# Patient Record
Sex: MALE | Race: OTHER | Employment: UNEMPLOYED | URBAN - METROPOLITAN AREA
[De-identification: names, ages, dates, MRNs, and addresses within clinical notes are randomized per-mention and may not be internally consistent; named-entity substitution may affect disease eponyms.]

---

## 2017-03-23 ENCOUNTER — HOSPITAL ENCOUNTER (OUTPATIENT)
Dept: LAB | Age: 32
Discharge: HOME | End: 2017-03-23

## 2017-03-23 ENCOUNTER — OFFICE VISIT (OUTPATIENT)
Dept: FAMILY MEDICINE CLINIC | Age: 32
End: 2017-03-23

## 2017-03-23 VITALS
TEMPERATURE: 97.7 F | HEART RATE: 64 BPM | SYSTOLIC BLOOD PRESSURE: 103 MMHG | BODY MASS INDEX: 33.46 KG/M2 | WEIGHT: 196 LBS | HEIGHT: 64 IN | DIASTOLIC BLOOD PRESSURE: 75 MMHG

## 2017-03-23 DIAGNOSIS — M89.9 LYTIC BONE LESION OF RIGHT FEMUR: Primary | ICD-10-CM

## 2017-03-23 DIAGNOSIS — M89.9 LYTIC BONE LESION OF RIGHT FEMUR: ICD-10-CM

## 2017-03-23 DIAGNOSIS — M79.604 PAIN OF RIGHT LOWER EXTREMITY: ICD-10-CM

## 2017-03-23 LAB
ALBUMIN SERPL BCP-MCNC: 4.7 G/DL (ref 3.5–5)
ALBUMIN/GLOB SERPL: 1.6 {RATIO} (ref 1.1–2.2)
ALP SERPL-CCNC: 69 U/L (ref 45–117)
ALT SERPL-CCNC: 33 U/L (ref 12–78)
ANION GAP BLD CALC-SCNC: 6 MMOL/L (ref 5–15)
AST SERPL W P-5'-P-CCNC: 12 U/L (ref 15–37)
BASOPHILS # BLD AUTO: 0 K/UL (ref 0–0.1)
BASOPHILS # BLD: 1 % (ref 0–1)
BILIRUB SERPL-MCNC: 0.7 MG/DL (ref 0.2–1)
BUN SERPL-MCNC: 11 MG/DL (ref 6–20)
BUN/CREAT SERPL: 14 (ref 12–20)
CALCIUM SERPL-MCNC: 9.6 MG/DL (ref 8.5–10.1)
CHLORIDE SERPL-SCNC: 104 MMOL/L (ref 97–108)
CO2 SERPL-SCNC: 28 MMOL/L (ref 21–32)
CREAT SERPL-MCNC: 0.78 MG/DL (ref 0.7–1.3)
EOSINOPHIL # BLD: 0.3 K/UL (ref 0–0.4)
EOSINOPHIL NFR BLD: 4 % (ref 0–7)
ERYTHROCYTE [DISTWIDTH] IN BLOOD BY AUTOMATED COUNT: 13.4 % (ref 11.5–14.5)
GLOBULIN SER CALC-MCNC: 3 G/DL (ref 2–4)
GLUCOSE SERPL-MCNC: 80 MG/DL (ref 65–100)
HCT VFR BLD AUTO: 47.5 % (ref 36.6–50.3)
HGB BLD-MCNC: 15.5 G/DL (ref 12.1–17)
LYMPHOCYTES # BLD AUTO: 27 % (ref 12–49)
LYMPHOCYTES # BLD: 1.7 K/UL (ref 0.8–3.5)
MCH RBC QN AUTO: 29.7 PG (ref 26–34)
MCHC RBC AUTO-ENTMCNC: 32.6 G/DL (ref 30–36.5)
MCV RBC AUTO: 91 FL (ref 80–99)
MONOCYTES # BLD: 0.5 K/UL (ref 0–1)
MONOCYTES NFR BLD AUTO: 8 % (ref 5–13)
NEUTS SEG # BLD: 3.9 K/UL (ref 1.8–8)
NEUTS SEG NFR BLD AUTO: 60 % (ref 32–75)
PLATELET # BLD AUTO: 318 K/UL (ref 150–400)
POTASSIUM SERPL-SCNC: 4.4 MMOL/L (ref 3.5–5.1)
PROT SERPL-MCNC: 7.7 G/DL (ref 6.4–8.2)
RBC # BLD AUTO: 5.22 M/UL (ref 4.1–5.7)
SODIUM SERPL-SCNC: 138 MMOL/L (ref 136–145)
WBC # BLD AUTO: 6.4 K/UL (ref 4.1–11.1)

## 2017-03-23 PROCEDURE — 85025 COMPLETE CBC W/AUTO DIFF WBC: CPT | Performed by: NURSE PRACTITIONER

## 2017-03-23 PROCEDURE — 80053 COMPREHEN METABOLIC PANEL: CPT | Performed by: NURSE PRACTITIONER

## 2017-03-23 RX ORDER — NAPROXEN 500 MG/1
500 TABLET ORAL 2 TIMES DAILY WITH MEALS
Qty: 60 TAB | Refills: 2 | Status: SHIPPED | OUTPATIENT
Start: 2017-03-23

## 2017-03-23 NOTE — PATIENT INSTRUCTIONS
Leg Pain: Care Instructions  Your Care Instructions  Many things can cause leg pain. Too much exercise or overuse can cause a muscle cramp (or charley horse). You can get leg cramps from not eating a balanced diet that has enough potassium, calcium, and other minerals. If you do not drink enough fluids or are taking certain medicines, you may develop leg cramps. Other causes of leg pain include injuries, blood flow problems, nerve damage, and twisted and enlarged veins (varicose veins). You can usually ease pain with self-care. Your doctor may recommend that you rest your leg and keep it elevated. Follow-up care is a key part of your treatment and safety. Be sure to make and go to all appointments, and call your doctor if you are having problems. Its also a good idea to know your test results and keep a list of the medicines you take. How can you care for yourself at home? · Take pain medicines exactly as directed. ¨ If the doctor gave you a prescription medicine for pain, take it as prescribed. ¨ If you are not taking a prescription pain medicine, ask your doctor if you can take an over-the-counter medicine. · Take any other medicines exactly as prescribed. Call your doctor if you think you are having a problem with your medicine. · Rest your leg while you have pain, and avoid standing for long periods of time. · Prop up your leg at or above the level of your heart when possible. · Make sure you are eating a balanced diet that is rich in calcium, potassium, and magnesium, especially if you are pregnant. · If directed by your doctor, put ice or a cold pack on the area for 10 to 20 minutes at a time. Put a thin cloth between the ice and your skin. · Your leg may be in a splint, a brace, or an elastic bandage, and you may have crutches to help you walk. Follow your doctor's directions about how long to wear supports and how to use the crutches. When should you call for help?   Call 911 anytime you think you may need emergency care. For example, call if:  · You have sudden chest pain and shortness of breath, or you cough up blood. · Your leg is cool or pale or changes color. Call your doctor now or seek immediate medical care if:  · You have increasing or severe pain. · Your leg suddenly feels weak and you cannot move it. · You have signs of a blood clot, such as:  ¨ Pain in your calf, back of the knee, thigh, or groin. ¨ Redness and swelling in your leg or groin. · You have signs of infection, such as:  ¨ Increased pain, swelling, warmth, or redness. ¨ Red streaks leading from the sore area. ¨ Pus draining from a place on your leg. ¨ A fever. · You cannot bear weight on your leg. Watch closely for changes in your health, and be sure to contact your doctor if:  · You do not get better as expected. Where can you learn more? Go to http://chema-maximiliano.info/. Enter F456 in the search box to learn more about \"Leg Pain: Care Instructions. \"  Current as of: May 27, 2016  Content Version: 11.1  © 6936-7272 [employer]. Care instructions adapted under license by [employer] (which disclaims liability or warranty for this information). If you have questions about a medical condition or this instruction, always ask your healthcare professional. [address] any warranty or liability for your use of this information.

## 2017-03-23 NOTE — PROGRESS NOTES
AVS printed and reviewed. MRI scheduled at [facility] for Curtis 3/26/17 w/ arrival at 7:45a for 8a test. Instructed No Metal on body. Billing issues w/hospital test discussed and encouraged to apply for financial assistance. Sent to meet w/ , Farshad Xavier to start [facility] intake process.  Discussed Access Now program.
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
## 2017-03-23 NOTE — MR AVS SNAPSHOT
Visit Information [facility] [place] Personal Médico Departamento Teléfono del Dep. Número de visita 3/23/2017  9:30 AM Shana No NP [place] [phone] Follow-up Instructions Return if symptoms worsen or fail to improve. Upcoming Health Maintenance Date Due DTaP/Tdap/Td series (1 - Tdap) 6/2/2008 INFLUENZA AGE 9 TO ADULT 8/1/2016 Alergias  Review Complete El: 3/23/2017 Por: Shana No NP  
 Dellie Life del:  3/23/2017 No Known Allergies Vacunas actuales [place] Mary Carmen No hay ninguna vacuna archivada. No revisadas esta visita You Were Diagnosed With   
  
 Lolly Sumner Lytic bone lesion of right femur    -  Primary ICD-10-CM: T98.9C1 ICD-9-CM: 733.99 Pain of right lower extremity     ICD-10-CM: M79.604 ICD-9-CM: 729.5 Partes vitales PS Pulso Temperatura Cedar ( percentil de crecimiento) Peso (percentil de crecimiento) BMI (IMC)  
 103/75 (BP 1 Location: Right arm, BP Patient Position: Sitting) 64 97.7 °F (36.5 °C) (Oral) 5' 4.37\" (1.635 m) 196 lb (88.9 kg) 33.26 kg/m2 Estatus de tabaquísmo Never Smoker Historial de signos vitales BMI and BSA Data Body Mass Index Body Surface Area  
 33.26 kg/m 2 2.01 m 2 [facility] Pharmacy Name Phone [facility], [address] [place] [address] Hooker lista de medicamentos actualizada Lista actualizada el: 3/23/17 11:42 AM.  Veronica Flores use hooker lista de medicamentos más reciente. naproxen 500 mg tablet También conocido yenni:  NAPROSYN Take 1 Tab by mouth two (2) times daily (with meals). Recetas Enviado a la [place] Refills  
 naproxen (NAPROSYN) 500 mg tablet 2 Sig: Take 1 Tab by mouth two (2) times daily (with meals).   
 Class: Normal  
 Pharmacy: [facility] [address], [address] #: [phone] Route: Oral  
  
Hicimos lo siguiente REFERRAL TO ORTHOPEDICS [QMK786 Custom] Comentarios: [facility] Now Referral Request Form: 
 
Has the patient live in the area for 6 months Yes Is the patient here on a visa No 
 
Priority: 
 
4 weeks Location: [facility] Patient Demographics: 
 
Date:  3/23/2017                          EMR# [id number] Chon Cannon YONAS 1985 Home Phone : [phone]             Cell Phone:   
 
Language :  [place] Specialist Requested:  Orthopedics Reason for Request:  Abnormal bone lesion found on R femoral CT, further eval recommended Specialist Requirements: 
 
If GYN Referral:   
Pap: n/a If Ortho Referral: MRI yes XRAY: no 
 
Pulmonary Referrals must have :  
C-X-ray no OR  
CT Scan yes Referring Provider:  Reji Bo NP Instrucciones de seguimiento Return if symptoms worsen or fail to improve. Por hacer 03/31/2017 Imaging:  MRI FEMUR RT W  WO CONT Instrucciones para el Paciente Leg Pain: Care Instructions Your Care Instructions Many things can cause leg pain. Too much exercise or overuse can cause a muscle cramp (or charley horse). You can get leg cramps from not eating a balanced diet that has enough potassium, calcium, and other minerals. If you do not drink enough fluids or are taking certain medicines, you may develop leg cramps. Other causes of leg pain include injuries, blood flow problems, nerve damage, and twisted and enlarged veins (varicose veins). You can usually ease pain with self-care. Your doctor may recommend that you rest your leg and keep it elevated. Follow-up care is a key part of your treatment and safety.  Be sure to make and go to all appointments, and call your doctor if you are having problems. Its also a good idea to know your test results and keep a list of the medicines you take. How can you care for yourself at home? · Take pain medicines exactly as directed. ¨ If the doctor gave you a prescription medicine for pain, take it as prescribed. ¨ If you are not taking a prescription pain medicine, ask your doctor if you can take an over-the-counter medicine. · Take any other medicines exactly as prescribed. Call your doctor if you think you are having a problem with your medicine. · Rest your leg while you have pain, and avoid standing for long periods of time. · Prop up your leg at or above the level of your heart when possible. · Make sure you are eating a balanced diet that is rich in calcium, potassium, and magnesium, especially if you are pregnant. · If directed by your doctor, put ice or a cold pack on the area for 10 to 20 minutes at a time. Put a thin cloth between the ice and your skin. · Your leg may be in a splint, a brace, or an elastic bandage, and you may have crutches to help you walk. Follow your doctor's directions about how long to wear supports and how to use the crutches. When should you call for help? Call 911 anytime you think you may need emergency care. For example, call if: 
· You have sudden chest pain and shortness of breath, or you cough up blood. · Your leg is cool or pale or changes color. Call your doctor now or seek immediate medical care if: 
· You have increasing or severe pain. · Your leg suddenly feels weak and you cannot move it. · You have signs of a blood clot, such as: 
¨ Pain in your calf, back of the knee, thigh, or groin. ¨ Redness and swelling in your leg or groin. · You have signs of infection, such as: 
¨ Increased pain, swelling, warmth, or redness. ¨ Red streaks leading from the sore area. ¨ Pus draining from a place on your leg. ¨ A fever. · You cannot bear weight on your leg. Watch closely for changes in your health, and be sure to contact your doctor if: 
· You do not get better as expected. Where can you learn more? Go to http://chema-maximiliano.info/. Enter V525 in the search box to learn more about \"Leg Pain: Care Instructions. \" Current as of: May 27, 2016 Content Version: 11.1 © 1022-9015 [employer]. Care instructions adapted under license by [employer] (which disclaims liability or warranty for this information). If you have questions about a medical condition or this instruction, always ask your healthcare professional. [place] [address] any warranty or liability for your use of this information. Introducing [facility]! [facility] introduce portal paciente MyChart . Ahora se puede acceder a partes de zazueta expediente médico, enviar por correo electrónico la oficina de zazueta médico y solicitar renovaciones de medicamentos en línea. En zazueta navegador de Internet , Conrado Ortega a [email or web address] Oneal clic en el usuario por [place] Veto? Lisa Gopi clic aquí en la sesión Adele Zehra. Verá la página de registro [place]. Ingrese zazueta código de [employer] seferino y yenni aparece a continuación. Usted no tendrá que [employer] código después de maria m completado el proceso de registro . Si usted no se inscribe antes de la fecha de caducidad , debe solicitar un nuevo código. · MyChart Código de acceso : [id number] Expires: 6/21/2017 11:41 AM 
 
Ingresa los últimos cuatro dígitos de zazueta Número de Seguro Social ( xxxx ) y fecha de nacimiento ( dd / mm / aaaa ) yenni se indica y oneal clic en Enviar. Usted será llevado a la siguiente página de registro . Crear un ID MyChart . Esta será zazueta ID de inicio de sesión de MyChart y no puede ser [place] , por lo que pensar en rai que es [place] Prim y fácil de recordar . Crear rai contraseña MyChart . Usted puede cambiar zazueta contraseña en cualquier momento . Ingrese zazueta Password Reset de preguntas y [place] . [place] se puede utilizar en un momento posterior si usted olvida zazueta contraseña. Introduzca zazueta dirección de correo electrónico . Filipe Quill recibirá rai notificación por correo electrónico cuando la nueva información está disponible en MyChart . Kortney Patee clic en Registrarse. Marlyse Alvarez rodney y descargar porciones de zazueta expediente médico. 
Felipa clic en el enlace de descarga del menú Resumen para descargar rai copia portátil de zazueta información médica . Si tiene [employer] , por favor visite la sección de preguntas frecuentes del sitio web [employer] . Recuerde, [employer] NO es que se utilizará para las necesidades urgentes. Para emergencias médicas , llame al 911 . Ahora disponible en zazueta iPhone y Android ! Por favor proporcione doreen resumen de la documentación de cuidado a zazueta próximo proveedor. If you have any questions after today's visit, please call [phone].

## 2017-03-26 ENCOUNTER — HOSPITAL ENCOUNTER (OUTPATIENT)
Dept: MRI IMAGING | Age: 32
Discharge: HOME | End: 2017-03-26
Attending: NURSE PRACTITIONER
Payer: SELF-PAY

## 2017-03-26 VITALS — BODY MASS INDEX: 33.26 KG/M2 | WEIGHT: 196 LBS

## 2017-03-26 DIAGNOSIS — M89.9 LYTIC BONE LESION OF RIGHT FEMUR: ICD-10-CM

## 2017-03-26 PROCEDURE — 73720 MRI LWR EXTREMITY W/O&W/DYE: CPT

## 2017-03-26 PROCEDURE — 74011250636 HC RX REV CODE- 250/636: Performed by: NURSE PRACTITIONER

## 2017-03-26 PROCEDURE — A9585 GADOBUTROL INJECTION: HCPCS | Performed by: NURSE PRACTITIONER

## 2017-03-26 RX ADMIN — GADOBUTROL 9 ML: 604.72 INJECTION INTRAVENOUS at 10:07

## 2017-03-29 ENCOUNTER — TELEPHONE (OUTPATIENT)
Dept: FAMILY MEDICINE CLINIC | Age: 32
End: 2017-03-29

## 2017-04-04 ENCOUNTER — TELEPHONE (OUTPATIENT)
Dept: FAMILY MEDICINE CLINIC | Age: 32
End: 2017-04-04

## 2017-04-04 NOTE — TELEPHONE ENCOUNTER
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 Recommend continued ortho eval via AN given location/size.

## 2017-06-02 ENCOUNTER — HOSPITAL ENCOUNTER (OUTPATIENT)
Dept: CT IMAGING | Age: 32
Discharge: HOME | End: 2017-06-02
Attending: ORTHOPAEDIC SURGERY

## 2017-06-02 DIAGNOSIS — M85.00 FIBROUS DYSPLASIA OF BONE: ICD-10-CM

## 2017-06-02 DIAGNOSIS — D72.18: ICD-10-CM

## 2017-06-02 DIAGNOSIS — M54.10 RADICULAR PAIN: ICD-10-CM

## 2017-06-02 DIAGNOSIS — C96.6: ICD-10-CM

## 2017-06-02 RX ORDER — FENTANYL CITRATE 50 UG/ML
100 INJECTION, SOLUTION INTRAMUSCULAR; INTRAVENOUS
Status: DISCONTINUED | OUTPATIENT
Start: 2017-06-02 | End: 2017-06-06 | Stop reason: HOSPADM

## 2017-06-02 RX ORDER — MIDAZOLAM HYDROCHLORIDE 1 MG/ML
5 INJECTION, SOLUTION INTRAMUSCULAR; INTRAVENOUS
Status: DISCONTINUED | OUTPATIENT
Start: 2017-06-02 | End: 2017-06-06 | Stop reason: HOSPADM

## 2017-06-13 ENCOUNTER — TELEPHONE (OUTPATIENT)
Dept: FAMILY MEDICINE CLINIC | Age: 32
End: 2017-06-13

## 2017-06-13 NOTE — TELEPHONE ENCOUNTER
Please call patient regarding problem he is having with [facility] referral.  Also, patient now has pain shooting down his leg.     Hood Zaidi

## 2017-06-14 NOTE — TELEPHONE ENCOUNTER
Per [facility], pt was only seen once by [address] [place] [address]. Ortho wants MRI done and biopsy. It appears no arrangements have been made to have these tests done. [facility] is going to contact [address] [place] [address] to ask for order for bone biopsy to be done by interventional radiology and for them to do the MRI at their facility.     If the patient has any questions he should call [facility] at [phone]

## 2017-06-14 NOTE — TELEPHONE ENCOUNTER
We are trying to get records of the patients last visit to Ortho. Their office is refusing to give us records without a signed release of records from the patient.